# Patient Record
Sex: MALE | Race: WHITE | NOT HISPANIC OR LATINO | Employment: FULL TIME | URBAN - METROPOLITAN AREA
[De-identification: names, ages, dates, MRNs, and addresses within clinical notes are randomized per-mention and may not be internally consistent; named-entity substitution may affect disease eponyms.]

---

## 2018-01-25 ENCOUNTER — HOSPITAL ENCOUNTER (OUTPATIENT)
Facility: MEDICAL CENTER | Age: 48
End: 2018-01-25
Payer: COMMERCIAL

## 2018-01-25 LAB
EST. AVERAGE GLUCOSE BLD GHB EST-MCNC: 143 MG/DL
HBA1C MFR BLD: 6.6 % (ref 0–5.6)

## 2018-01-26 LAB
CHOLEST SERPL-MCNC: 246 MG/DL (ref 100–199)
GLUCOSE SERPL-MCNC: 92 MG/DL (ref 65–99)
HDLC SERPL-MCNC: 47 MG/DL
LDLC SERPL CALC-MCNC: 153 MG/DL
TRIGL SERPL-MCNC: 229 MG/DL (ref 0–149)

## 2018-01-27 LAB — LPA SERPL-MCNC: 4 MG/DL

## 2018-01-30 LAB
CHOLEST SERPL-MCNC: 253 MG/DL
HDL PARTICAL NO Q4363: 35.6 UMOL/L
HDL SIZE Q4361: 8.4 NM
HDLC SERPL-MCNC: 43 MG/DL (ref 40–59)
HLD.LARGE SERPL-SCNC: <2.8 UMOL/L
L VLDL PART NO Q4357: 13.8 NMOL/L
LDL SERPL QN: 20.7 NM
LDL SERPL-SCNC: 2319 NMOL/L
LDL SMALL SERPL-SCNC: >1085 NMOL/L
LDLC SERPL CALC-MCNC: 162 MG/DL
PATHOLOGY STUDY: ABNORMAL
TRIGL SERPL-MCNC: 241 MG/DL (ref 30–149)
VLDL SIZE Q4362: 54.3 NM

## 2018-05-03 ENCOUNTER — HOSPITAL ENCOUNTER (OUTPATIENT)
Facility: MEDICAL CENTER | Age: 48
End: 2018-05-03
Attending: FAMILY MEDICINE
Payer: COMMERCIAL

## 2018-05-03 LAB
EST. AVERAGE GLUCOSE BLD GHB EST-MCNC: 131 MG/DL
GLUCOSE SERPL-MCNC: 103 MG/DL (ref 65–99)
HBA1C MFR BLD: 6.2 % (ref 0–5.6)

## 2018-05-04 LAB — LPA SERPL-MCNC: 6 MG/DL

## 2018-05-07 LAB
CHOLEST SERPL-MCNC: 225 MG/DL
HDL PARTICAL NO Q4363: 33.5 UMOL/L
HDL SIZE Q4361: 8.3 NM
HDLC SERPL-MCNC: 41 MG/DL (ref 40–59)
HLD.LARGE SERPL-SCNC: <2.8 UMOL/L
L VLDL PART NO Q4357: 6.1 NMOL/L
LDL SERPL QN: 20.8 NM
LDL SERPL-SCNC: 1920 NMOL/L
LDL SMALL SERPL-SCNC: 870 NMOL/L
LDLC SERPL CALC-MCNC: 148 MG/DL
PATHOLOGY STUDY: ABNORMAL
TRIGL SERPL-MCNC: 179 MG/DL (ref 30–149)
VLDL SIZE Q4362: 48.3 NM

## 2018-06-13 ENCOUNTER — OFFICE VISIT (OUTPATIENT)
Dept: URGENT CARE | Facility: PHYSICIAN GROUP | Age: 48
End: 2018-06-13
Payer: COMMERCIAL

## 2018-06-13 VITALS
OXYGEN SATURATION: 98 % | SYSTOLIC BLOOD PRESSURE: 116 MMHG | TEMPERATURE: 97.9 F | WEIGHT: 213 LBS | HEIGHT: 66 IN | BODY MASS INDEX: 34.23 KG/M2 | DIASTOLIC BLOOD PRESSURE: 78 MMHG | HEART RATE: 68 BPM

## 2018-06-13 DIAGNOSIS — H60.502 ACUTE OTITIS EXTERNA OF LEFT EAR, UNSPECIFIED TYPE: ICD-10-CM

## 2018-06-13 DIAGNOSIS — H66.92 LEFT OTITIS MEDIA, UNSPECIFIED OTITIS MEDIA TYPE: ICD-10-CM

## 2018-06-13 PROCEDURE — 99203 OFFICE O/P NEW LOW 30 MIN: CPT | Performed by: FAMILY MEDICINE

## 2018-06-13 RX ORDER — OFLOXACIN 3 MG/ML
5 SOLUTION AURICULAR (OTIC) DAILY
Qty: 1 BOTTLE | Refills: 0 | Status: SHIPPED | OUTPATIENT
Start: 2018-06-13 | End: 2018-06-20

## 2018-06-13 RX ORDER — AMOXICILLIN 875 MG/1
875 TABLET, COATED ORAL 2 TIMES DAILY
Qty: 14 TAB | Refills: 0 | Status: SHIPPED | OUTPATIENT
Start: 2018-06-13 | End: 2018-06-20

## 2018-06-13 ASSESSMENT — ENCOUNTER SYMPTOMS
WEIGHT LOSS: 0
COUGH: 1
EYE DISCHARGE: 0
EYE REDNESS: 0

## 2018-06-13 NOTE — PROGRESS NOTES
"Subjective:      Michael Newman is a 48 y.o. male who presents with Otalgia (left ear pain x6 days)            6 days left earache, muffled hearing, nasal congestion has been improving. No fever. No drainage from ear. PMH rare otitis media as adult. No swimming. No trauma/barotrauma. No OTC medications. No other aggravating or alleviating factors.          Review of Systems   Constitutional: Negative for malaise/fatigue and weight loss.   HENT: Negative for tinnitus.    Eyes: Negative for discharge and redness.   Respiratory: Positive for cough (due to PND).    Skin: Negative for itching and rash.     .  Medications, Allergies, and current problem list reviewed today in Epic       Objective:     /78   Pulse 68   Temp 36.6 °C (97.9 °F)   Ht 1.676 m (5' 6\")   Wt 96.6 kg (213 lb)   SpO2 98%   BMI 34.38 kg/m²      Physical Exam   Constitutional: He appears well-developed and well-nourished. No distress.   HENT:   Head: Normocephalic and atraumatic.   Right Ear: External ear normal.   Right canal red and swollen. TM is red and dull. Unable to determine if effusion. Pain with pinna movement.   Eyes: Conjunctivae are normal.   Neck: Neck supple.   Cardiovascular: Normal rate, regular rhythm and normal heart sounds.    Pulmonary/Chest: Effort normal and breath sounds normal.   Lymphadenopathy:     He has no cervical adenopathy.   Neurological:   Speech is clear. Patient is appropriate and cooperative.     Skin: Skin is warm and dry.               Assessment/Plan:     1. Left otitis media, unspecified otitis media type  amoxicillin (AMOXIL) 875 MG tablet   2. Acute otitis externa of left ear, unspecified type  ofloxacin otic sol (FLOXIN OTIC) 0.3 % Solution     Differential diagnosis, natural history, supportive care, and indications for immediate follow-up discussed at length.   Suspect otitis externa is primary but can't r/o otitis media  He may start with gtt and wait and see to use amoxicillin.     "

## 2018-09-13 ENCOUNTER — OFFICE VISIT (OUTPATIENT)
Dept: URGENT CARE | Facility: PHYSICIAN GROUP | Age: 48
End: 2018-09-13
Payer: COMMERCIAL

## 2018-09-13 VITALS
SYSTOLIC BLOOD PRESSURE: 114 MMHG | WEIGHT: 208 LBS | BODY MASS INDEX: 33.43 KG/M2 | RESPIRATION RATE: 18 BRPM | DIASTOLIC BLOOD PRESSURE: 76 MMHG | HEART RATE: 76 BPM | HEIGHT: 66 IN | TEMPERATURE: 98 F | OXYGEN SATURATION: 98 %

## 2018-09-13 DIAGNOSIS — R09.82 PND (POST-NASAL DRIP): ICD-10-CM

## 2018-09-13 PROCEDURE — 99214 OFFICE O/P EST MOD 30 MIN: CPT | Performed by: PHYSICIAN ASSISTANT

## 2018-09-13 RX ORDER — METHYLPREDNISOLONE 4 MG/1
TABLET ORAL
Qty: 21 TAB | Refills: 0 | Status: SHIPPED | OUTPATIENT
Start: 2018-09-13

## 2018-09-13 RX ORDER — TRIAMCINOLONE ACETONIDE 40 MG/ML
40 INJECTION, SUSPENSION INTRA-ARTICULAR; INTRAMUSCULAR ONCE
Status: DISCONTINUED | OUTPATIENT
Start: 2018-09-13 | End: 2018-09-13

## 2018-09-13 RX ORDER — METHYLPREDNISOLONE SODIUM SUCCINATE 125 MG/2ML
125 INJECTION, POWDER, LYOPHILIZED, FOR SOLUTION INTRAMUSCULAR; INTRAVENOUS ONCE
Status: COMPLETED | OUTPATIENT
Start: 2018-09-13 | End: 2018-09-13

## 2018-09-13 RX ADMIN — METHYLPREDNISOLONE SODIUM SUCCINATE 125 MG: 125 INJECTION, POWDER, LYOPHILIZED, FOR SOLUTION INTRAMUSCULAR; INTRAVENOUS at 11:32

## 2018-09-13 NOTE — PROGRESS NOTES
"Chief Complaint   Patient presents with   • Cough     Post nasal drip x 5 days       HISTORY OF PRESENT ILLNESS: Patient is a 48 y.o. male who presents today for about 4-5 days of new onset of \"post nasal drip\"    He states this generally happens twice a year, often in the fall and spring.  He states that he has seen by allergist in the past and that he has not tested positive for any specific allergens.  He has been having dry, barking cough.   He feels Benadryl helps but \"just knocks me out\"   Nasal sprays and other antihistamines have not worked.  Usually is self limited but has been helped in past with steroid.  No fevers, chest pain or painful breathing, no wheezing.       Patient Active Problem List    Diagnosis Date Noted   • Allergic rhinitis 2010   • Fatty liver    • Dyslipidemia        Allergies:Neosporin [neomycin-polymyxin b]    No current Epic-ordered outpatient prescriptions on file.     No current Epic-ordered facility-administered medications on file.        Past Medical History:   Diagnosis Date   • Allergic rhinitis    • Dyslipidemia    • Fatty liver        Social History   Substance Use Topics   • Smoking status: Never Smoker   • Smokeless tobacco: Never Used   • Alcohol use No       Family Status   Relation Status   • Fa  at age 51        CAD   • PGFa  at age 50's        CAD   • Mo Alive   • Sis Alive     Family History   Problem Relation Age of Onset   • Heart Disease Father    • Heart Disease Paternal Grandfather    • Cancer Sister         Rajeev       ROS:  Review of Systems   Constitutional: Negative for fever, chills, weight loss and malaise/fatigue.   HENT: SEE HPI  Eyes: Negative for blurred vision.   Respiratory: SEE HPI  Cardiovascular: Negative for chest pain, palpitations, orthopnea and leg swelling.   Gastrointestinal: Negative for heartburn, nausea, vomiting and abdominal pain.   Genitourinary: Negative for dysuria, urgency and frequency.     Exam:  Blood " "pressure 114/76, pulse 76, temperature 36.7 °C (98 °F), resp. rate 18, height 1.676 m (5' 6\"), weight 94.3 kg (208 lb), SpO2 98 %.  General:  Well nourished, well developed male in NAD  Eyes: PERRLA, EOM within normal limits, no conjunctival injection, no scleral icterus, visual fields and acuity grossly intact.  Ears: Normal shape and symmetry, no tenderness, no discharge. External canals are without any significant edema or erythema. Tympanic membranes are without any inflammation, no effusion. Gross auditory acuity is intact  Nose: Symmetrical, sinuses without tenderness, pale boggy turbinates.   Scant clear rhinorrhea.    Mouth: reasonable hygiene, no erythema exudates or tonsillar enlargement.  Neck: no masses, range of motion within normal limits, no tracheal deviation. No lymphadenopathy  Pulmonary: Normal respiratory effort, no wheezes, crackles, or rhonchi.  Dry/barking cough intermittently.   Cardiovascular: regular rate and rhythm without murmurs, rubs, or gallops.  Skin: No visible rashes or lesion. Warm, pink, dry.   Extremities: no clubbing, cyanosis, or edema.  Neuro: A&O x 3. Speech normal/clear.  Normal gait.         Assessment/Plan:  1. PND (post-nasal drip)  methylPREDNISolone sod succ (SOLU-MEDROL) 125 MG injection 125 mg    MethylPREDNISolone (MEDROL DOSEPAK) 4 MG Tablet Therapy Pack    DISCONTINUED: triamcinolone acetonide (KENALOG-40) injection 40 mg       -Kenalog shot not available in clinic  -SoluMedrol given with follow up Medrol dose pack. This was discussed in detail, pros/cons of steroid therapy.  He expresses understanding of all and elects to proceed.   -saline irrigation, Benadryl ok at bedtime  -humidifier      Supportive care, differential diagnoses, and indications for immediate follow-up discussed with patient.   Pathogenesis of diagnosis discussed including typical length and natural progression.   Instructed to return to clinic or nearest emergency department for any change in " condition, further concerns, or worsening of symptoms.  Patient states understanding of the plan of care and discharge instructions.  Instructed to make an appointment, for follow up, with their primary care provider.      Faith Horner P.A.-C.

## 2020-08-11 ENCOUNTER — OFFICE VISIT (OUTPATIENT)
Dept: URGENT CARE | Facility: PHYSICIAN GROUP | Age: 50
End: 2020-08-11
Payer: COMMERCIAL

## 2020-08-11 VITALS
DIASTOLIC BLOOD PRESSURE: 96 MMHG | SYSTOLIC BLOOD PRESSURE: 122 MMHG | WEIGHT: 236 LBS | TEMPERATURE: 97.2 F | HEIGHT: 65 IN | RESPIRATION RATE: 16 BRPM | HEART RATE: 86 BPM | BODY MASS INDEX: 39.32 KG/M2 | OXYGEN SATURATION: 97 %

## 2020-08-11 DIAGNOSIS — R09.82 POST-NASAL DRIP: ICD-10-CM

## 2020-08-11 DIAGNOSIS — R05.9 COUGH: ICD-10-CM

## 2020-08-11 PROCEDURE — 99214 OFFICE O/P EST MOD 30 MIN: CPT | Performed by: PHYSICIAN ASSISTANT

## 2020-08-11 RX ORDER — BENZONATATE 100 MG/1
100 CAPSULE ORAL 3 TIMES DAILY PRN
Qty: 60 CAP | Refills: 0 | Status: SHIPPED | OUTPATIENT
Start: 2020-08-11

## 2020-08-11 RX ORDER — METHYLPREDNISOLONE 4 MG/1
TABLET ORAL
Qty: 21 TAB | Refills: 0 | Status: SHIPPED | OUTPATIENT
Start: 2020-08-11

## 2020-08-11 RX ORDER — DOXYCYCLINE HYCLATE 100 MG
100 TABLET ORAL 2 TIMES DAILY
Qty: 20 TAB | Refills: 0 | Status: SHIPPED | OUTPATIENT
Start: 2020-08-11 | End: 2020-08-21

## 2020-08-11 ASSESSMENT — ENCOUNTER SYMPTOMS
DIZZINESS: 0
MYALGIAS: 0
HEADACHES: 0
FEVER: 0
SINUS PAIN: 0
ABDOMINAL PAIN: 0
NAUSEA: 0
TINGLING: 0
VOMITING: 0
COUGH: 1
SORE THROAT: 0
CHILLS: 0
RHINORRHEA: 0
SHORTNESS OF BREATH: 0
PALPITATIONS: 0
WHEEZING: 0

## 2020-08-11 ASSESSMENT — COPD QUESTIONNAIRES: COPD: 0

## 2020-08-11 NOTE — PROGRESS NOTES
Subjective:   Michael Newman is a 50 y.o. male who presents for Cough (x3days, pt states he gets sinusitis)      Cough  This is a new (3 days. Pt has a history of sinusitis and cough that occurred 2-3 times per year.) problem. The problem has been gradually worsening. The problem occurs constantly. The cough is productive of sputum. Associated symptoms include nasal congestion and postnasal drip. Pertinent negatives include no chest pain, chills, ear pain, fever, headaches, myalgias, rash, rhinorrhea, sore throat, shortness of breath or wheezing. There is no history of asthma or COPD.       Review of Systems   Constitutional: Negative for chills, fever and malaise/fatigue.   HENT: Positive for postnasal drip. Negative for ear pain, rhinorrhea, sinus pain and sore throat.    Respiratory: Positive for cough. Negative for shortness of breath and wheezing.    Cardiovascular: Negative for chest pain and palpitations.   Gastrointestinal: Negative for abdominal pain, nausea and vomiting.   Musculoskeletal: Negative for myalgias.   Skin: Negative for rash.   Neurological: Negative for dizziness, tingling and headaches.       Medications:    • methylPREDNISolone Tbpk    Allergies: Neosporin [neomycin-polymyxin b]    Problem List: Michael Newman has Fatty liver; Dyslipidemia; and Allergic rhinitis on their problem list.    Surgical History:  Past Surgical History:   Procedure Laterality Date   • ANKLE ORIF  1980's    left   • TONSILLECTOMY         Past Social Hx: Michael Newman  reports that he has never smoked. He has never used smokeless tobacco. He reports that he does not drink alcohol or use drugs.     Past Family Hx:  Michael Newman family history includes Cancer in his sister; Heart Disease in his father and paternal grandfather.     Problem list, medications, and allergies reviewed by myself today in Epic.     Objective:     /96 (BP Location: Right arm, Patient Position: Sitting, BP Cuff Size: Large adult)   Pulse  "86   Temp 36.2 °C (97.2 °F) (Temporal)   Resp 16   Ht 1.651 m (5' 5\")   Wt 107 kg (236 lb)   SpO2 97%   BMI 39.27 kg/m²     Physical Exam  Constitutional:       General: He is not in acute distress.     Appearance: Normal appearance. He is not ill-appearing, toxic-appearing or diaphoretic.   HENT:      Head: Normocephalic and atraumatic.      Right Ear: Tympanic membrane, ear canal and external ear normal.      Left Ear: Tympanic membrane, ear canal and external ear normal.      Nose: Congestion present. No rhinorrhea.      Mouth/Throat:      Mouth: Mucous membranes are moist.      Pharynx: Posterior oropharyngeal erythema present. No oropharyngeal exudate.      Comments: Post nasal drip    Eyes:      Extraocular Movements: Extraocular movements intact.      Conjunctiva/sclera: Conjunctivae normal.      Pupils: Pupils are equal, round, and reactive to light.   Neck:      Musculoskeletal: Normal range of motion. No muscular tenderness.   Cardiovascular:      Rate and Rhythm: Normal rate and regular rhythm.      Pulses: Normal pulses.      Heart sounds: Normal heart sounds.   Pulmonary:      Effort: Pulmonary effort is normal.      Breath sounds: Normal breath sounds. No wheezing, rhonchi or rales.   Lymphadenopathy:      Cervical: No cervical adenopathy.   Skin:     General: Skin is warm and dry.      Capillary Refill: Capillary refill takes less than 2 seconds.   Neurological:      General: No focal deficit present.      Mental Status: He is alert and oriented to person, place, and time. Mental status is at baseline.   Psychiatric:         Mood and Affect: Mood normal.         Thought Content: Thought content normal.           Assessment/Plan:     Diagnosis and associated orders:   1. Post-nasal drip    - methylPREDNISolone (MEDROL DOSEPAK) 4 MG Tablet Therapy Pack; Follow schedule on package instructions.  Dispense: 21 Tab; Refill: 0  - benzonatate (TESSALON) 100 MG Cap; Take 1 Cap by mouth 3 times a day as " needed for Cough.  Dispense: 60 Cap; Refill: 0  - doxycycline (VIBRAMYCIN) 100 MG Tab; Take 1 Tab by mouth 2 times a day for 10 days.  Dispense: 20 Tab; Refill: 0    2. Cough    - methylPREDNISolone (MEDROL DOSEPAK) 4 MG Tablet Therapy Pack; Follow schedule on package instructions.  Dispense: 21 Tab; Refill: 0  - benzonatate (TESSALON) 100 MG Cap; Take 1 Cap by mouth 3 times a day as needed for Cough.  Dispense: 60 Cap; Refill: 0  - doxycycline (VIBRAMYCIN) 100 MG Tab; Take 1 Tab by mouth 2 times a day for 10 days.  Dispense: 20 Tab; Refill: 0       Comments/MDM:       • Patient has a past medical history for recurrent sinusitis and postnasal drip symptoms.  Patient states he has tried OTC medications in the past such as Flonase and Zyrtec with no relief.  He states this happens about 2 times a year and is usually relieved with steroids.  At this time we agreed to start steroids he may use Tessalon as needed for the cough.  A contingent antibiotic prescription was provided he may take this if he meets the criteria we discussed in clinic.  Recommended increase fluid intake and rest.  Follow-up in clinic if symptoms persist.  If any red flag symptoms present return to the clinic or nearest emergency department.             Differential diagnosis, natural history, supportive care, and indications for immediate follow-up discussed.    Advised the patient to follow-up with the primary care physician for recheck, reevaluation, and consideration of further management.    Please note that this dictation was created using voice recognition software. I have made reasonable attempt to correct obvious errors, but I expect that there are errors of grammar and possibly content that I did not discover before finalizing the note.    This note was electronically signed by JULIEN Kumar PA-C

## 2025-02-25 ENCOUNTER — HOSPITAL ENCOUNTER (OUTPATIENT)
Dept: RADIOLOGY | Facility: MEDICAL CENTER | Age: 55
End: 2025-02-25
Attending: PHYSICIAN ASSISTANT
Payer: COMMERCIAL

## 2025-02-25 ENCOUNTER — OFFICE VISIT (OUTPATIENT)
Dept: URGENT CARE | Facility: PHYSICIAN GROUP | Age: 55
End: 2025-02-25
Payer: COMMERCIAL

## 2025-02-25 VITALS
TEMPERATURE: 97.2 F | RESPIRATION RATE: 18 BRPM | HEART RATE: 75 BPM | DIASTOLIC BLOOD PRESSURE: 90 MMHG | HEIGHT: 65 IN | WEIGHT: 238.1 LBS | SYSTOLIC BLOOD PRESSURE: 132 MMHG | BODY MASS INDEX: 39.67 KG/M2 | OXYGEN SATURATION: 97 %

## 2025-02-25 DIAGNOSIS — R05.9 COUGH, UNSPECIFIED TYPE: ICD-10-CM

## 2025-02-25 DIAGNOSIS — J18.9 PNEUMONIA OF LEFT LOWER LOBE DUE TO INFECTIOUS ORGANISM: ICD-10-CM

## 2025-02-25 PROCEDURE — 99204 OFFICE O/P NEW MOD 45 MIN: CPT | Performed by: PHYSICIAN ASSISTANT

## 2025-02-25 PROCEDURE — 3075F SYST BP GE 130 - 139MM HG: CPT | Performed by: PHYSICIAN ASSISTANT

## 2025-02-25 PROCEDURE — 3080F DIAST BP >= 90 MM HG: CPT | Performed by: PHYSICIAN ASSISTANT

## 2025-02-25 PROCEDURE — 71046 X-RAY EXAM CHEST 2 VIEWS: CPT

## 2025-02-25 RX ORDER — DOXYCYCLINE 100 MG/1
100 CAPSULE ORAL 2 TIMES DAILY
Qty: 14 CAPSULE | Refills: 0 | Status: SHIPPED | OUTPATIENT
Start: 2025-02-25 | End: 2025-03-04

## 2025-02-25 RX ORDER — DOXYCYCLINE 100 MG/1
100 CAPSULE ORAL 2 TIMES DAILY
Qty: 14 CAPSULE | Refills: 0 | Status: SHIPPED | OUTPATIENT
Start: 2025-02-25 | End: 2025-02-25

## 2025-02-25 ASSESSMENT — ENCOUNTER SYMPTOMS
DIZZINESS: 0
SPUTUM PRODUCTION: 1
EYE DISCHARGE: 0
WHEEZING: 0
SHORTNESS OF BREATH: 0
VOMITING: 0
EYE REDNESS: 0
DIARRHEA: 0
FEVER: 0
COUGH: 1
SORE THROAT: 0

## 2025-02-26 NOTE — PROGRESS NOTES
"Subjective     Michael Newman is a 54 y.o. male who presents with Cough (Cough x 1 month. Sometimes productive in the AM, drier in the PM. Gets long coughs every winter.)            Patient is a 54-year-old male presents to urgent care with cough for over a month.  Patient reports prior history of same in the past around this time a year that he will develop a notable cough that will linger for several weeks.  It was just previously suspected that patient may have allergies and has been given steroid injections in the past which typically will clear up the cough.  Patient has been utilizing cough drops with mild improvement of symptoms.  Currently denies any shortness of breath, wheezing.  He does report productive nature of cough in the mornings mainly.    Cough  This is a new problem. The current episode started more than 1 month ago. The problem has been waxing and waning. Cough characteristics: Some production in the morning. Pertinent negatives include no eye redness, fever, sore throat, shortness of breath or wheezing.       Review of Systems   Constitutional:  Negative for fever and malaise/fatigue.   HENT:  Negative for congestion and sore throat.    Eyes:  Negative for discharge and redness.   Respiratory:  Positive for cough and sputum production. Negative for shortness of breath and wheezing.    Gastrointestinal:  Negative for diarrhea and vomiting.   Neurological:  Negative for dizziness.              Objective     BP (!) 132/90 (BP Location: Right arm, Patient Position: Sitting, BP Cuff Size: Adult long)   Pulse 75   Temp 36.2 °C (97.2 °F) (Temporal)   Resp 18   Ht 1.651 m (5' 5\")   Wt 108 kg (238 lb 1.6 oz)   SpO2 97%   BMI 39.62 kg/m²    PMH:  has a past medical history of Allergic rhinitis, Dyslipidemia, and Fatty liver.    He has no past medical history of ASTHMA.  MEDS: Reviewed .   ALLERGIES:   Allergies   Allergen Reactions    Neosporin [Neomycin-Polymyxin B]      Rash, swelling     SURGHX: "   Past Surgical History:   Procedure Laterality Date    ORIF, ANKLE  1980's    left    TONSILLECTOMY       SOCHX:  reports that he has never smoked. He has never used smokeless tobacco. He reports that he does not drink alcohol and does not use drugs.  FH: Family history was reviewed, no pertinent findings to report    Physical Exam  Vitals reviewed.   Constitutional:       Appearance: Normal appearance. He is well-developed.   HENT:      Head: Normocephalic and atraumatic.      Ears:      Comments: Bilateral clear middle ear effusions.     Mouth/Throat:      Comments: Posterior oropharynx is erythematous, positive postnasal drainage.  No evidence of exudate.  Eyes:      Conjunctiva/sclera: Conjunctivae normal.      Pupils: Pupils are equal, round, and reactive to light.   Cardiovascular:      Rate and Rhythm: Normal rate and regular rhythm.      Heart sounds: No murmur heard.  Pulmonary:      Effort: Pulmonary effort is normal. No respiratory distress.      Comments: Bilateral lower lobe Rales  Musculoskeletal:         General: Normal range of motion.      Cervical back: Normal range of motion and neck supple.      Right lower leg: No edema.      Left lower leg: No edema.   Lymphadenopathy:      Cervical: No cervical adenopathy.   Skin:     General: Skin is warm.      Findings: No rash.   Neurological:      Mental Status: He is alert and oriented to person, place, and time.   Psychiatric:         Mood and Affect: Mood normal.         Behavior: Behavior normal.         Thought Content: Thought content normal.                                  Assessment & Plan  Pneumonia of left lower lobe due to infectious organism    Orders:    doxycycline (MONODOX) 100 MG capsule; Take 1 Capsule by mouth 2 times a day for 7 days.    Cough, unspecified type    Orders:    DX-CHEST-2 VIEWS; Future             MDM/ Plan /Discussion:    Chest x-ray: Minimal patchy opacity to the left lung base with linear opacity to the right lung  base.     I reviewed the x-ray and agree with official read.  Concern for superimposed developing left lung base pneumonia was difficult to exclude patient on patient's current symptoms we will treat for the above at this time and encourage patient to utilize over-the-counter antihistamines to assist with allergic trigger as well.  Encourage patient to follow-up with his PCP to ensure that symptoms are improving at this time.      Differential diagnosis, natural history, supportive care, and indications for immediate follow-up discussed. Side effects of OTC or prescribed medications discussed.      Follow-up as needed if symptoms worsen or fail to improve to PCP, Urgent care or Emergency Room.     I have personally reviewed prior external notes and test results pertinent to today's visit.  I have independently reviewed and interpreted all diagnostics ordered during this urgent care acute visit.   Discussed management options (risks,benefits, and alternatives to treatment).    The patient and/or guardian demonstrated a good understanding and agreed with the treatment plan. And all questions were answered.  Please note that this dictation was created using voice recognition software. I have made a reasonable attempt to correct obvious errors, but I expect that there are errors of grammar and possibly content that I did not discover before finalizing the note.